# Patient Record
Sex: FEMALE | Race: WHITE
[De-identification: names, ages, dates, MRNs, and addresses within clinical notes are randomized per-mention and may not be internally consistent; named-entity substitution may affect disease eponyms.]

---

## 2018-12-20 ENCOUNTER — HOSPITAL ENCOUNTER (INPATIENT)
Dept: HOSPITAL 80 - FED | Age: 58
LOS: 3 days | Discharge: HOME | DRG: 856 | End: 2018-12-23
Attending: INTERNAL MEDICINE | Admitting: INTERNAL MEDICINE
Payer: COMMERCIAL

## 2018-12-20 DIAGNOSIS — Z85.3: ICD-10-CM

## 2018-12-20 DIAGNOSIS — Z88.0: ICD-10-CM

## 2018-12-20 DIAGNOSIS — R65.20: ICD-10-CM

## 2018-12-20 DIAGNOSIS — E87.1: ICD-10-CM

## 2018-12-20 DIAGNOSIS — T81.44XA: Primary | ICD-10-CM

## 2018-12-20 DIAGNOSIS — F17.210: ICD-10-CM

## 2018-12-20 DIAGNOSIS — E87.2: ICD-10-CM

## 2018-12-20 DIAGNOSIS — T85.79XA: ICD-10-CM

## 2018-12-20 DIAGNOSIS — L03.313: ICD-10-CM

## 2018-12-20 DIAGNOSIS — B96.1: ICD-10-CM

## 2018-12-20 DIAGNOSIS — Z92.3: ICD-10-CM

## 2018-12-20 DIAGNOSIS — Z90.12: ICD-10-CM

## 2018-12-20 DIAGNOSIS — Z98.82: ICD-10-CM

## 2018-12-20 DIAGNOSIS — B95.2: ICD-10-CM

## 2018-12-20 DIAGNOSIS — L02.213: ICD-10-CM

## 2018-12-20 DIAGNOSIS — I96: ICD-10-CM

## 2018-12-20 DIAGNOSIS — D64.9: ICD-10-CM

## 2018-12-20 LAB — PLATELET # BLD: 208 10^3/UL (ref 150–400)

## 2018-12-20 PROCEDURE — C1751 CATH, INF, PER/CENT/MIDLINE: HCPCS

## 2018-12-20 PROCEDURE — 0HPU0JZ REMOVAL OF SYNTHETIC SUBSTITUTE FROM LEFT BREAST, OPEN APPROACH: ICD-10-PCS | Performed by: SURGERY

## 2018-12-20 PROCEDURE — 0HB5XZZ EXCISION OF CHEST SKIN, EXTERNAL APPROACH: ICD-10-PCS | Performed by: SURGERY

## 2018-12-20 PROCEDURE — 0J960ZZ DRAINAGE OF CHEST SUBCUTANEOUS TISSUE AND FASCIA, OPEN APPROACH: ICD-10-PCS | Performed by: SURGERY

## 2018-12-20 NOTE — EDPHY
H & P


Stated Complaint: L nipple swelling redness


Time Seen by Provider: 12/20/18 18:02


HPI/ROS: 





CHIEF COMPLAINT:  Left breast infection





HISTORY OF PRESENT ILLNESS:  58-year-old female presents with a left breast 

infection.  She had a mastectomy in August 2018 and had an implant placed at 

that time.  In September 2018 she had nipple reconstruction.  The nipple became 

necrotic in the postop period and she has followed up her surgeon.  However 

over the last 10 days, she has developed erythema over the left breast, 

associated with increasing moderate pain and purulent drainage.  Today she 

developed a fever and feels ill.





REVIEW OF SYSTEMS:  complete 10 point ROS reviewed and is negative except for 

the noted elements in the HPI





Source: Patient





- Personal History


Current Tetanus/Diphtheria Vaccine: Unsure


Current Tetanus Diphtheria and Acellular Pertussis (TDAP): Unsure





- Medical/Surgical History


Hx Asthma: No


Hx Chronic Respiratory Disease: No


Hx Diabetes: No


Hx Cardiac Disease: No


Hx Renal Disease: No


Hx Cirrhosis: No


Hx Alcoholism: No


Hx HIV/AIDS: No


Hx Splenectomy or Spleen Trauma: No


Other PMH: breast CA 2004, tonsilectomy, breast reconstruction





- Social History


Smoking Status: Current some day smoker


Alcohol Use: Sober


Drug Use: None





- Physical Exam


Exam: 





General Appearance:  Alert, pleasant, nontoxic-appearing


Eyes:  Pupils equal and round, no conjunctival pallor or injection


ENT, Mouth:  Mucous membranes moist


Neck:  Normal inspection


Respiratory:  Lungs are clear to auscultation


Breast:  Diffuse erythema, swelling and tenderness of the left breast, the 

erythema extends to the right anterior chest wall, necrosis of the nipple, with 

purulent drainage between the nipple and the remainder of the breast


Cardiovascular:  Regular tachycardia


Gastrointestinal:  Abdomen is soft and nontender


Neurological:  A&O, nonfocal exam


Skin:  Warm and dry


Extremities:  Normal inspection, no swelling


Psychiatric:  Mood and affect normal





Constitutional: 


 Initial Vital Signs











Temperature (C)  39.2 C H  12/20/18 17:36


 


Heart Rate  123 H  12/20/18 17:36


 


Respiratory Rate  16   12/20/18 17:36


 


Blood Pressure  156/97 H  12/20/18 17:36


 


O2 Sat (%)  92   12/20/18 17:36








 











O2 Delivery Mode               Room Air














Allergies/Adverse Reactions: 


 





amoxicillin Allergy (Verified 12/20/18 17:35)


 


Penicillins Allergy (Verified 12/20/18 17:35)


 








Home Medications: 














 Medication  Instructions  Recorded


 


Aspirin [Aspirin 81mg (*)] 81 mg PO DAILY 12/20/18


 


Cholecalciferol Vit D3 [Vitamin D3 1,000 units PO DAILY 12/20/18





(*)]  


 


Herbals/Supplements -Info Only 1 ea PO DAILY 12/20/18


 


Letrozole [Femara 2.5 mg (*)] 2.5 mg PO DAILY 12/20/18


 


Vancomycin [Vancomycin (*)] 1,250 mg IV DAILY  vial 12/23/18


 


levOFLOXACIN [levAQUIN (*)] 750 mg PO HS #10 tab 12/23/18














Medical Decision Making


ED Course/Re-evaluation: 





This pt presents with left breast mastitis/abscess and infection of the 

implant.  She meets SIRS criteria, initial lactate 2.8.  c/w severe sepsis.  

IVF per sepsis protocol initiated.  Wound cx obtained.  Ancef 2gm IV given 

after blood cultures obtained.  Dr. Del Richter was consulted and saw the 

pt in the ED.  Repeat lactate obtained and is 2.2.  The hospitalist service was 

consulted for admission.  The pt was taken directly to the OR by Dr. Richter. BP adequate throughout ED stay.





This pt utilized 35 minutes of critical care time exclusive of unbundled 

procedures.  Time spent in serial assessments of pt, ordering/review of labs, 

medication ordering, discussions with consultants.





  


Differential Diagnosis: 





includes though not limited to other causes of infection such as pneumonia, 

cellulitis, pyelo, intraabd infection








- Data Points


Laboratory Results: 


 Laboratory Results





 12/20/18 18:23 





 12/20/18 18:23 








Medications Given: 


 








Discontinued Medications





Acetaminophen (Tylenol)  1,000 mg PO EDNOW ONE


   Stop: 12/20/18 17:45


   Last Admin: 12/20/18 17:47 Dose:  1,000 mg


Acetaminophen (Tylenol)  650 mg PO Q6HRS PRN


   PRN Reason: Pain, Mild/Fever, Can Take PO


   Stop: 06/19/19 06:28


   Last Admin: 12/21/18 18:23 Dose:  650 mg


Bacitracin (Bacitracin Syringe) Confirm Administered Dose 100,000 units IRR .STK

-MED ONE


   Stop: 12/20/18 19:40


   Last Admin: 12/20/18 21:12 Dose:  Not Given


Bupivacaine HCl (Sensorcaine 0.5% Vial) Confirm Administered Dose 30 ml .ROUTE 

.STK-MED ONE


   Stop: 12/20/18 20:46


   Last Admin: 12/20/18 21:12 Dose:  30 ml


Cholecalciferol (Vitamin D)  1,000 units PO DAILY ANNAMARIE


   Stop: 06/19/19 08:59


   Last Admin: 12/23/18 08:13 Dose:  1,000 units


Sodium Chloride (Ns)  1,500 mls @ 3,000 mls/hr 30 ml/kg infuse over 30 min (

1500 ml) IV EDNOW ONE


   PRN Reason: Protocol


   Stop: 12/20/18 19:00


   Last Admin: 12/20/18 18:45 Dose:  1,500 mls


Cefazolin Sodium/Dextrose (Ancef)  100 mls @ 200 mls/hr IV EDNOW ONE


   PRN Reason: Protocol


   Stop: 12/20/18 19:14


   Last Admin: 12/20/18 18:50 Dose:  100 mls


Lactated Ringer's (Lr)  1,000 mls @ 0 mls/hr IV ONCE ONE


   PRN Reason: KVO


   Stop: 12/20/18 19:54


   Last Admin: 12/20/18 20:27 Dose:  1,000 mls


Cefazolin Sodium/Dextrose (Ancef)  100 mls @ 200 mls/hr IV Q8HRS ANNAMARIE


   PRN Reason: Protocol


   Stop: 01/20/19 05:59


   Last Admin: 12/21/18 04:57 Dose:  100 mls


Sodium Chloride (Ns)  500 mls @ 0 mls/hr IV ONCE ONE


   PRN Reason: Wide Open


   Stop: 12/21/18 07:01


   Last Admin: 12/21/18 07:02 Dose:  500 mls


Cefepime HCl 2 gm/ Sodium (Chloride)  100 mls @ 200 mls/hr IV Q8H ANNAMARIE


   PRN Reason: Protocol


   Stop: 01/20/19 10:29


   Last Admin: 12/23/18 10:28 Dose:  100 mls


Vancomycin HCl 750 mg/ (Dextrose)  165 mls @ 165 mls/hr IV Q12H ANNAMARIE


   PRN Reason: Protocol


   Stop: 01/20/19 10:29


   Last Admin: 12/23/18 11:20 Dose:  165 mls


Sodium Chloride (Ns)  1,000 mls @ 3,000 mls/hr IV ONCE ONE


   Stop: 12/21/18 10:49


   Last Admin: 12/21/18 10:39 Dose:  1,000 mls


Letrozole (Femara)  2.5 mg PO DAILY [facility]


   Stop: 06/19/19 08:59


   Last Admin: 12/23/18 08:13 Dose:  2.5 mg


Midazolam HCl (Versed)  2 mg IVP ONCALL ONE


   Stop: 12/20/18 20:15


   Last Admin: 12/20/18 20:26 Dose:  2 mg


Oxycodone HCl (Oxycodone Ir)  5 - 10 mg PO Q4HRS PRN


   PRN Reason: PACU, Pain Severe


   Stop: 12/20/18 21:17


   Last Admin: 12/20/18 21:27 Dose:  5 mg


Oxycodone HCl (Oxycodone Ir)  5 - 10 mg PO Q4HRS PRN


   PRN Reason: Pain, Severe Able to Take PO


   Stop: 12/30/18 21:09


   Last Admin: 12/23/18 08:12 Dose:  5 mg


Polymyxin B Sulfate (Polymyxin B Syringe) Confirm Administered Dose 1,000,000 

unit IRR .STK-MED ONE


   Stop: 12/20/18 19:40


   Last Admin: 12/20/18 21:12 Dose:  Not Given


Potassium Chloride (Klor-Con)  40 meq PO ONCE ONE


   Stop: 12/22/18 10:14


   Last Admin: 12/22/18 10:49 Dose:  40 meq


Senna/Docusate Sodium (Senokot-S)  1 - 2 tab PO BID ANNAMARIE


   PRN Reason: Protocol


   Stop: 06/21/19 11:29


   Last Admin: 12/23/18 12:16 Dose:  2 tab


Vancomycin HCl (Vancomycin Hcl) Confirm Administered Dose 1 gm .ROUTE .STK-MED 

ONE


   Stop: 12/20/18 19:40


   Last Admin: 12/20/18 21:13 Dose:  Not Given








Departure





- Departure


Disposition: [facility]


Clinical Impression: 


Infection of breast implant


Qualifiers:


 Encounter type: initial encounter Qualified Code(s): T85.79XA - Infection and 

inflammatory reaction due to other internal prosthetic devices, implants and 

grafts, initial encounter





Condition: Serious

## 2018-12-20 NOTE — POSTANESTH
Post Anesthetic Evaluation


Cardiovascular Status: Similar to Pre-Op Cond (A little tachycardic (105), 

likely due to dehydration and fever.)


Respiratory Status: Normal, Stable, Similar to Pre-op Cond.


Level of Consciousness/Mental Status: Can Participate in Eval, Mildly Sleepy, 

Arousable


Pain Control: Adequate, Prn Tx Ordered


Nausea/Vomiting Control: Adequate, Prn Tx Ordered


Complications Possibly Related to Anesthesia: None Noted

## 2018-12-20 NOTE — PDGENHP
History and Physical


History and Physical: 





Chief complaint: []





History of present illness: []





Past medical history: []





Past surgical history:  []





Medications: []





Allergies: []





Social history: []





Family history: []





Review of systems:  10 point review of systems was conducted and is negative 

except per HPI





Physical exam:


Vitals:  Reviewed





Labs: []





Other Data: []





Impression and plan:


[]

## 2018-12-20 NOTE — PDGENHP
History and Physical





- Chief Complaint


L breast infection





- History of Present Illness


Otherwise health 57yo F from [place] presents with acute onset L breast pain and 

drainage. Briefly, patient is s/p mastectomy c radiation and recon for L breast 

cancer. Most treatment took place in Spring of 2017. Has had nipple healing 

issues since the operation and has had revision x1. Is in town visiting family 

and noticed that the chest wall has been somewhat erythematous for about the 

last week or so. Initially blamed this on heating pads she was using to augment 

blood flow. Today, hasnt felt well all day and has noticed drainage from the 

area which prompted her presentation here. She denies fevers at home, but 

endorses chills. Pain is located over the majority of the L breast is 7/10 in 

intensity and nonradiating and is described as burning. 





History Information





- Allergies/Home Medication List


Allergies/Adverse Reactions: 








amoxicillin Allergy (Verified 12/20/18 17:35)


 


Penicillins Allergy (Verified 12/20/18 17:35)


 





Home Medications: 








Aspirin [Aspirin 81mg (*)] 81 mg PO DAILY 12/20/18 [Last Taken Unknown]


Cholecalciferol Vit D3 [Vitamin D3 (*)] 1,000 units PO DAILY 12/20/18 [Last 

Taken Unknown]


Herbals/Supplements -Info Only 1 ea PO DAILY 12/20/18 [Last Taken Unknown]


Letrozole [Femara 2.5 mg (*)] 2.5 mg PO DAILY 12/20/18 [Last Taken 12/20/18]





I have personally reviewed and updated: family history, medical history, social 

history, surgical history


Past Medical History: breast cancer





- Surgical History


Additional surgical history: L mastectomy, tissue expanders and implant c 

nipple revision





- Family History


Positive for: non-pertinent





- Social History


Smoking Status: Current some day smoker


Alcohol Use: Sober


Drug Use: None


Additional social history: in town from [place] visiting family





Review of Systems


Review of Systems: 





ROS: 10pt was reviewed & negative except for what was stated in HPI & below





Physical Exam


Physical Exam: 

















Temp Pulse Resp BP Pulse Ox


 


 98.8 C H  97   18   134/81 H  97 


 


 12/20/18 18:44  12/20/18 19:15  12/20/18 19:15  12/20/18 19:15  12/20/18 19:15











Constitutional: appears nourished, not in pain, uncomfortable


Eyes: PERRL, anicteric sclera, EOMI


Ears, Nose, Mouth, Throat: moist mucous membranes, hearing normal, ears appear 

normal, no oral mucosal ulcers


Cardiovascular: regular rate and rhythym, no murmur, rub, or gallop, No edema


Respiratory: no respiratory distress, no rales or rhonchi, clear to auscultation


Gastrointestinal: normoactive bowel sounds, soft, non-tender abdomen, no 

palpable masses


Genitourinary: no bladder fullness, no bladder tenderness


Skin: other (L breast: central portion adjacent to nipple is open and tracks 

likely to implant. Nipple is necrotic. Breast is indurated, erythematous and 

very tender. ), No mottled


Musculoskeletal: full muscle strength, no muscle tenderness, normal joint ROM, 

no joint effusions


Neurologic: AAOx3, sensation intact bilaterally, No weakness, No numbness


Psychiatric: interacting appropriately, not anxious, not encephalopathic, 

thought process linear


Lymph, Heme, Immunologic: no cervical LAD, no supraclavicular LAD





Lab Data & Imaging Review





 12/20/18 18:23





 12/20/18 18:23














WBC  6.80 10^3/uL (3.80-9.50)   12/20/18  18:23    


 


RBC  3.39 10^6/uL (4.18-5.33)  L  12/20/18  18:23    


 


Hgb  11.9 g/dL (12.6-16.3)  L  12/20/18  18:23    


 


Hct  33.3 % (38.0-47.0)  L  12/20/18  18:23    


 


MCV  98.2 fL (81.5-99.8)   12/20/18  18:23    


 


MCH  35.1 pg (27.9-34.1)  H  12/20/18  18:23    


 


MCHC  35.7 g/dL (32.4-36.7)   12/20/18  18:23    


 


RDW  13.0 % (11.5-15.2)   12/20/18  18:23    


 


Plt Count  208 10^3/uL (150-400)   12/20/18  18:23    


 


MPV  9.3 fL (8.7-11.7)   12/20/18  18:23    


 


Neut % (Auto)  75.7 % (39.3-74.2)  H  12/20/18  18:23    


 


Lymph % (Auto)  12.2 % (15.0-45.0)  L  12/20/18  18:23    


 


Mono % (Auto)  11.5 % (4.5-13.0)   12/20/18  18:23    


 


Eos % (Auto)  0.0 % (0.6-7.6)  L  12/20/18  18:23    


 


Baso % (Auto)  0.3 % (0.3-1.7)   12/20/18  18:23    


 


Nucleat RBC Rel Count  0.0 % (0.0-0.2)   12/20/18  18:23    


 


Absolute Neuts (auto)  5.15 10^3/uL (1.70-6.50)   12/20/18  18:23    


 


Absolute Lymphs (auto)  0.83 10^3/uL (1.00-3.00)  L  12/20/18  18:23    


 


Absolute Monos (auto)  0.78 10^3/uL (0.30-0.80)   12/20/18  18:23    


 


Absolute Eos (auto)  0.00 10^3/uL (0.03-0.40)  L  12/20/18  18:23    


 


Absolute Basos (auto)  0.02 10^3/uL (0.02-0.10)   12/20/18  18:23    


 


Absolute Nucleated RBC  0.00 10^3/uL (0-0.01)   12/20/18  18:23    


 


Immature Gran %  0.3 % (0.0-1.1)   12/20/18  18:23    


 


Immature Gran #  0.02 10^3/uL (0.00-0.10)   12/20/18  18:23    


 


VBG Lactic Acid  2.8 mmol/L (0.7-2.1)  H  12/20/18  18:20    


 


Sodium  133 mEq/L (135-145)  L  12/20/18  18:23    


 


Potassium  3.7 mEq/L (3.5-5.2)   12/20/18  18:23    


 


Chloride  100 mEq/L ()   12/20/18  18:23    


 


Carbon Dioxide  21 mEq/l (22-31)  L  12/20/18  18:23    


 


Anion Gap  12 mEq/L (6-14)   12/20/18  18:23    


 


BUN  7 mg/dL (7-23)   12/20/18  18:23    


 


Creatinine  0.6 mg/dL (0.6-1.0)   12/20/18  18:23    


 


Estimated GFR  > 60   12/20/18  18:23    


 


Glucose  104 mg/dL ()  H  12/20/18  18:23    


 


Calcium  8.9 mg/dL (8.5-10.4)   12/20/18  18:23    








Chest X-Ray results: no infiltrate





Assessment & Plan


Assessment: 





57yo F c L breast infection c implant


Plan: 





discussed with the patient that the treatment for this is source control. This 

includes removal of the implant.


- plan for OR for washout and explant of silicone implant


- BS abx


- will ultimately need revision, will likely have this done in [place] once source 

is washed.

## 2018-12-20 NOTE — PDANEPAE
ANE Past Medical History





- Pulmonary History


Hx Oxygen in Use at Home: No





- Endocrine History


Hx Diabetes: No


Obesity: no


Endocrine History Comment: Graves disease 30 years ago--"it burned itself out".





ANE Review of Systems


Review of Systems: 








ANE Patient History





- Allergies


Allergies/Adverse Reactions: 








amoxicillin Allergy (Verified 12/20/18 17:35)


 


Penicillins Allergy (Verified 12/20/18 17:35)


 








- Home Medications


Home Medications: 








Aspirin [Aspirin 81mg (*)] 81 mg PO DAILY 12/20/18 [Last Taken Unknown]


Cholecalciferol Vit D3 [Vitamin D3 (*)] 1,000 units PO DAILY 12/20/18 [Last 

Taken Unknown]


Herbals/Supplements -Info Only 1 ea PO DAILY 12/20/18 [Last Taken Unknown]


Letrozole [Femara 2.5 mg (*)] 2.5 mg PO DAILY 12/20/18 [Last Taken 12/20/18]








- NPO status


NPO Since - Liquids (Date): 12/20/18


NPO Since - Liquids (Time): 18:35


NPO Since - Solids (Date): 12/20/18


NPO Since - Solids (Time): 11:30





- Anes Hx


Anes Hx: no prior problems





- Smoking Hx


Smoking Status: Current some day smoker





- Alcohol Use


Alcohol Use: Sober





ANE Labs/Vital Signs





- Labs


Result Diagrams: 


 12/20/18 18:23





 12/20/18 18:23





- Vital Signs


Blood Pressure: 119/84


Heart Rate: 91


Respiratory Rate: 18


O2 Sat (%): 95


Height: 162.56 cm


Weight: 50.349 kg





ANE Physical Exam





- Airway


Neck exam: FROM


Mallampati Score: Class 2


Mouth exam: normal dental/mouth exam





- Pulmonary


Pulmonary: no respiratory distress, no rales or rhonchi, clear to auscultation





- Cardiovascular


Cardiovascular: regular rate and rhythym, no murmur, rub, or gallop





- ASA Status


ASA Status: II





ANE Anesthesia Plan


Anesthesia Plan: GA w LMA

## 2018-12-21 LAB — PLATELET # BLD: 186 10^3/UL (ref 150–400)

## 2018-12-21 PROCEDURE — 02H633Z INSERTION OF INFUSION DEVICE INTO RIGHT ATRIUM, PERCUTANEOUS APPROACH: ICD-10-PCS | Performed by: RADIOLOGY

## 2018-12-21 RX ADMIN — ACETAMINOPHEN PRN MG: 325 TABLET ORAL at 06:51

## 2018-12-21 RX ADMIN — VITAMIN D, TAB 1000IU (100/BT) SCH UNITS: 25 TAB at 09:07

## 2018-12-21 RX ADMIN — VANCOMYCIN HYDROCHLORIDE SCH MLS: 500 INJECTION, POWDER, LYOPHILIZED, FOR SOLUTION INTRAVENOUS at 11:32

## 2018-12-21 RX ADMIN — DEXTROSE MONOHYDRATE SCH MLS: 5 INJECTION, SOLUTION INTRAVENOUS at 12:58

## 2018-12-21 RX ADMIN — DEXTROSE MONOHYDRATE SCH MLS: 5 INJECTION, SOLUTION INTRAVENOUS at 17:40

## 2018-12-21 RX ADMIN — LETROZOLE SCH MG: 2.5 TABLET, FILM COATED ORAL at 09:07

## 2018-12-21 RX ADMIN — VANCOMYCIN HYDROCHLORIDE SCH MLS: 500 INJECTION, POWDER, LYOPHILIZED, FOR SOLUTION INTRAVENOUS at 23:06

## 2018-12-21 RX ADMIN — OXYCODONE HYDROCHLORIDE PRN MG: 15 TABLET ORAL at 00:03

## 2018-12-21 RX ADMIN — ACETAMINOPHEN PRN MG: 325 TABLET ORAL at 18:23

## 2018-12-21 NOTE — GCON
INFECTIOUS DISEASE CONSULTATION



DATE OF CONSULTATION:  12/21/2018



REFERRING PHYSICIAN:  Sander Chacon MD





REASON FOR CONSULTATION:  Sepsis due to left-sided breast infection.



HISTORY OF PRESENT ILLNESS:  The patient is a 58-year-old female with a past medical history of breas
t cancer, status post mastectomy with reconstruction and nipple revision, who I am asked to see in co
nsultation for sepsis associated with left breast infection.  The patient describes having left maste
ctomy and reconstruction in August of 2018 for breast cancer.  She notes that she also had radiation 
therapy.  The patient subsequently had nipple revision and developed difficulty with nipple healing a
nd presence of necrosis.  Over the last 10 days, patient has noted increasing induration, tenderness 
and erythema over the left breast.  This subsequently became associated with malodorous purulent drai
nage.  The patient also had malaise and decreased appetite.  She did not note fever or chills.  



Upon presentation to [facility], she had a temperature maximum of 39.2 with associated 
mild increase in lactate and tachycardia.  She was taken to the operating room yesterday where she wa
s noted to have a necrotic nipple with implant exposure.  The patient had the necrotic nipple removed
, and her implant also was removed.  Purulent fluid was noted to be present in the entire implant cav
ity.  No other abscesses were noted.  The patient was being treated with cefazolin postoperatively.  
Gram stain of the patient's operative specimen is polymicrobial with presence of GPCs in clusters, GP
Cs in chains, and gram-negative rods.  Based on these findings, her antibiotics have been modified ea
rlier today to include vancomycin and cefepime.  The patient had some transient hypotension earlier t
murali which appears to be responding to IV fluid.  Lactic acid decreased yesterday to 1.2.  The patien
t does not note any other areas of skin and soft tissue infection.  No history of MRSA infection.  No
 recent antibiotic use.  No history of nausea, vomiting or diarrhea.  Given the above findings, I am 
now asked to assist in her ongoing management.



PAST MEDICAL HISTORY:  Breast cancer, Graves disease.



PAST SURGICAL HISTORY:  Left-sided mastectomy with reconstruction including implant placement and nip
ple revision, tonsillectomy.



CURRENT MEDICATIONS:  Vancomycin 750 mg IV q.12 hours, cefepime 2 g IV q.8 hours, vitamin D 1000 unit
s p.o. daily, letrozole 2.5 mg p.o. daily, Oxy IR as needed.



ALLERGIES:  Penicillin associated with rash (no hives).



SOCIAL HISTORY:  Patient smokes several cigarettes daily or every few days.  The patient notes she dr
inks wine with dinner and has cocktails daily.  No drug use.  No pets.  Typically lives near [place].




FAMILY HISTORY:  Graves disease, breast cancer.



REVIEW OF SYSTEMS:  Outside that noted in HPI, the remainder of a 10-system review is unremarkable.



PHYSICAL EXAMINATION:  VITAL SIGNS:  Temperature maximum 39.2, temperature current 36.7, heart rate 8
2, respiratory rate 18, blood pressure 101/70, oxygen saturation 99% on 1.5 L.  GENERAL:  Patient is 
a thin female in no acute distress.  She appears nontoxic.  HEENT:  There is no scleral icterus, conj
unctival injection, or conjunctival petechiae.  The oropharynx is clear without lesions.  Dentition i
n fair repair.  There is no nasal discharge.  There is no tenderness over the frontal maxillary or ma
stoid area.  Mucous membranes are moist.  NECK:  Supple without palpable lymphadenopathy or thyromega
ly.  CHEST:  Clear to auscultation bilaterally without adventitious sounds.  Respiratory effort is no
rmal.  CARDIOVASCULAR:  Regular rate and rhythm without murmurs, gallops, or rubs.  ABDOMEN:  Soft, n
ontender, nondistended.  Bowel sounds are present.  BREASTS:  The left breast is dressed postoperativ
ely.  No erythema extending past dressing margins.  MUSCULOSKELETAL:  No cyanosis, clubbing, or edema
.  SKIN:  No rashes present.  No stigmata of endocarditis.  Skin is warm and dry to touch.  NEUROLOGI
C:  Patient is alert and interacts appropriately with examiner.  Cranial nerves 2-12 are grossly inta
ct.  Sensation is grossly intact.  Muscle tone and bulk are normal. LYMPHATICS:  No cervical or supra
clavicular nodes.



LABORATORY DATA:  White blood cell count 6.7, hematocrit 31.9, platelets 186; no differential perform
ed today.  Serum creatinine 0.6, bicarbonate 23, lactic acid 1.2.  Blood cultures x2 sets pending.  G
michelle stain from the operative specimen shows 4+ white blood cells, 4+ GPCs in chains, 3+ gram-negative
 rods, 2+ GPCs in clusters.  Chest x-ray shows no evidence of pneumonia.



IMPRESSION:  Sepsis due to left breast infection status post incision and drainage with implant remov
al and nipple excision:  Gram stain is polymicrobial, which is compatible with presence of necrotic n
ipple.  Gram-positive cocci in clusters are consistent with Staphylococcus aureus.  The patient curre
ntly receiving vancomycin and cefepime, which is reasonable coverage based on Gram stain findings.  W
ill hold off on addition of anaerobic coverage currently unless anaerobic jamal isolated.  We will co
ntinue to assess breast findings over time.  Will follow blood cultures as available.



RECOMMENDATIONS:  

1.  Agree with empiric vancomycin and cefepime.

2.  Side effects of vancomycin and cefepime reviewed with patient. 

3.  Follow up cultures with modification of antibiotics accordingly.

4.  Follow up blood cultures as available. 

5.  Follow clinical response to above measures over time.  



Thank you for this consultation.  We will continue to follow the patient with you.





Job #:  [id number]

## 2018-12-21 NOTE — GOP
DATE OF OPERATION:  12/20/2018



SURGEON:  Del Richter MD



ASSISTANT:  None.



ANESTHESIA:  General endotracheal.



ANESTHESIOLOGIST:  Dr. Jimmie Hillman



PREOPERATIVE DIAGNOSIS:  Left breast abscess.



POSTOPERATIVE DIAGNOSIS:  Left breast abscess.



PROCEDURE PERFORMED:  Incision and drainage of left breast abscess with removal of breast implant.



FINDINGS:  Implant was clearly exposed where the necrotic nipple had been.  Purulent fluid surrounded
 it in the entire cavity.  Implant successfully removed.  Cavity washed out.



SPECIMENS:  Cultures and implant to pathology.



ESTIMATED BLOOD LOSS:  5 cc.



DESCRIPTION OF PROCEDURE:  The patient was greeted in the preoperative suite.  Once again, risks, rafiq
efits, and alternatives were discussed.  Consent was signed.  She was then brought back to the operat
akash suite, placed on the OR table in supine position.  After all anesthesia machines, including SCDs 
were on and functioning, a [employer] time-out was performed.  After successful induct
ion of general anesthesia, the left chest was prepped and draped in the typical sterile fashion.  



I commenced the procedure basically by digitizing the area where the necrotic nipple had pulled away 
from the skin.  The underlying implant was directly visible at that site and digitalizing the area pr
oduced foul smelling purulent fluid.  The remainder of the necrotic nipple was removed sharply throug
h her previous inferolateral incision where the implant was originally placed.  I opened this up matheus
ply to the extent of the incision.  Just deep to this, the implant was encountered.  It was grasped w
ith an Allis clamp and removed with gentle pressure.  It was passed off.  Once the implant was remove
d, the cavity was interrogated.  No other fluid pockets or abscesses were encountered.  It was then i
rrigated with 3 liters sterile saline.  



After irrigation, I infiltrated the area with Marcaine plain.  I saw no other significant pathology. 
 The capsule through my incision was then closed first with interrupted Vicryl.  The skin was then cl
osed with staples.  The central portion, which was open previously was left open and the area was suc
cessfully packed with gauze.  Pressure dressing was placed.  The patient was then extubated in the op
erative suite and taken to the PACU in satisfactory condition.



DRAINS:  None.



COUNTS:  All counts were reported as correct x2.





Job #:  [id number]

## 2018-12-21 NOTE — PDMN
Medical Necessity


Medical necessity: Change to inpt as of 12/21/18 @ 10:46 as pt meets inpt 

criteria per MD order and MCG M-160, Sepsis and Other Febrile Illness, without 

Focal Infection and M-70, Cellulitis. 57 y/o w/hx breast cancer/mastectomy and 

reconstruction August 2018 w/complicated recovery, presented w/increasing pain 

and redness to L breast, admitted w/L breast cellulitis and sepsis, required I&

D w/implant removal and washout upon admission. Upgraded to inpt for severe 

sepsis d/t L breast infection as evidenced by lactic acidosis, fever- 101 this 

AM, and hypotension w/BP this AM 85/53. ID consult, surg following, IVF, IV ABX'

s, pain management. Est LOS>2MN for ongoing eval/management of above.

## 2018-12-21 NOTE — PDGENHP
History and Physical





- Chief Complaint


L breast redness, swelling





- History of Present Illness


59 yo F w/ hx of breast CA presents with L breast redness and swelling. She 

underwent L mastectomy and reconstruction in August of 2018 for breast CA. Her 

recovery from this has been complicated. The nipple became necrotic and never 

fully healed. Over the last few days she has noticed redness over the L breast 

associated with increasing pain and purulent drainage. On the day of admission 

she developed fevers so she came in for evaluation.





In the ED she was noted to be febrile and tachycardic with an elevated lactate. 

She was taken to the OR by surgery for an I&D with findings consistent with 

infection per the brief post-operative note. I evaluated patient in the post-op 

setting and she is doing quite well. Her pain is well controlled with oxycodone 

and she denies other symptoms at this time.





Case discussed with Dr. Vaughn; records reviewed and summarized above.





History Information





- Allergies/Home Medication List


Allergies/Adverse Reactions: 








amoxicillin Allergy (Verified 12/20/18 17:35)


 


Penicillins Allergy (Verified 12/20/18 17:35)


 





Home Medications: 








Aspirin [Aspirin 81mg (*)] 81 mg PO DAILY 12/20/18 [Last Taken Unknown]


Cholecalciferol Vit D3 [Vitamin D3 (*)] 1,000 units PO DAILY 12/20/18 [Last 

Taken Unknown]


Herbals/Supplements -Info Only 1 ea PO DAILY 12/20/18 [Last Taken Unknown]


Letrozole [Femara 2.5 mg (*)] 2.5 mg PO DAILY 12/20/18 [Last Taken 12/20/18]





I have personally reviewed and updated: family history, medical history


Past Medical History: breast cancer





- Past Medical History


cancer





- Surgical History


Additional surgical history: L mastectomy, tissue expanders and implant c 

nipple revision





- Family History


Positive for: non-pertinent





- Social History


Smoking Status: Current some day smoker


Alcohol Use: Sober


Drug Use: None


Additional social history: in town from [place] visiting family





Review of Systems


Review of Systems: 





ROS: 10pt was reviewed & negative except for what was stated in HPI & below





Physical Exam


Physical Exam: 

















Temp Pulse Resp BP Pulse Ox


 


 37.0 C   98   16   132/79 H  95 


 


 12/21/18 00:00  12/21/18 00:00  12/21/18 00:00  12/21/18 00:00  12/21/18 00:00




















O2 (L/minute)                  1














Constitutional: no apparent distress, uncomfortable


Eyes: PERRL, EOMI


Ears, Nose, Mouth, Throat: moist mucous membranes, no oral mucosal ulcers


Cardiovascular: regular rate and rhythym, systolic murmur


Respiratory: no respiratory distress, clear to auscultation


Gastrointestinal: normoactive bowel sounds, soft, non-tender abdomen


Skin: warm, other (L breast bandage c/d/i)


Musculoskeletal: full muscle strength, no muscle tenderness


Neurologic: AAOx3, CN II-XII Intact


Psychiatric: interacting appropriately, not anxious





Lab Data & Imaging Review





 12/20/18 18:23





 12/20/18 18:23














WBC  6.80 10^3/uL (3.80-9.50)   12/20/18  18:23    


 


RBC  3.39 10^6/uL (4.18-5.33)  L  12/20/18  18:23    


 


Hgb  11.9 g/dL (12.6-16.3)  L  12/20/18 18:23    


 


Hct  33.3 % (38.0-47.0)  L  12/20/18  18:23    


 


MCV  98.2 fL (81.5-99.8)   12/20/18  18:23    


 


MCH  35.1 pg (27.9-34.1)  H  12/20/18 18:23    


 


MCHC  35.7 g/dL (32.4-36.7)   12/20/18  18:23    


 


RDW  13.0 % (11.5-15.2)   12/20/18 18:23    


 


Plt Count  208 10^3/uL (150-400)   12/20/18  18:23    


 


MPV  9.3 fL (8.7-11.7)   12/20/18  18:23    


 


Neut % (Auto)  75.7 % (39.3-74.2)  H  12/20/18 18:23    


 


Lymph % (Auto)  12.2 % (15.0-45.0)  L  12/20/18  18:23    


 


Mono % (Auto)  11.5 % (4.5-13.0)   12/20/18  18:23    


 


Eos % (Auto)  0.0 % (0.6-7.6)  L  12/20/18  18:23    


 


Baso % (Auto)  0.3 % (0.3-1.7)   12/20/18  18:23    


 


Nucleat RBC Rel Count  0.0 % (0.0-0.2)   12/20/18  18:23    


 


Absolute Neuts (auto)  5.15 10^3/uL (1.70-6.50)   12/20/18  18:23    


 


Absolute Lymphs (auto)  0.83 10^3/uL (1.00-3.00)  L  12/20/18  18:23    


 


Absolute Monos (auto)  0.78 10^3/uL (0.30-0.80)   12/20/18  18:23    


 


Absolute Eos (auto)  0.00 10^3/uL (0.03-0.40)  L  12/20/18  18:23    


 


Absolute Basos (auto)  0.02 10^3/uL (0.02-0.10)   12/20/18  18:23    


 


Absolute Nucleated RBC  0.00 10^3/uL (0-0.01)   12/20/18  18:23    


 


Immature Gran %  0.3 % (0.0-1.1)   12/20/18  18:23    


 


Immature Gran #  0.02 10^3/uL (0.00-0.10)   12/20/18  18:23    


 


VBG Lactic Acid  1.2 mmol/L (0.7-2.1)   12/20/18  21:48    


 


Sodium  133 mEq/L (135-145)  L  12/20/18  18:23    


 


Potassium  3.7 mEq/L (3.5-5.2)   12/20/18  18:23    


 


Chloride  100 mEq/L ()   12/20/18  18:23    


 


Carbon Dioxide  21 mEq/l (22-31)  L  12/20/18  18:23    


 


Anion Gap  12 mEq/L (6-14)   12/20/18  18:23    


 


BUN  7 mg/dL (7-23)   12/20/18  18:23    


 


Creatinine  0.6 mg/dL (0.6-1.0)   12/20/18  18:23    


 


Estimated GFR  > 60   12/20/18  18:23    


 


Glucose  104 mg/dL ()  H  12/20/18  18:23    


 


Calcium  8.9 mg/dL (8.5-10.4)   12/20/18  18:23    








Imaging Review: 





 Imaging Impressions





Chest X-Ray  12/20/18 18:02


Impression: Question mild bronchitis. No other findings for acute 

cardiopulmonary abnormality.














Assessment & Plan


Assessment: 





59 yo F w/ hx of L breast CA and mastectomy/reconstruction presents with L 

breast infection.


Plan: 


1. Sepsis 2/2 L breast cellulitis - Sepsis per fever and tachycardia; lactate 

elevated on presentation. Complicated by presence of silicone implant; now s/p 

washout in OR and removal of implant per general surgery.


- Cefazolin 2g IV q8h


- Blood and tissue cultures pending


- Oxycodone PRN for pain


- Will need reconstruction but will likely return home to [place] for this


2. Breast CA - With mastectomy and reconstruction in August of 2018.


- Continue Letrozole daily


3. Hyponatremia - Mild, suspect secondary to infection and sepsis.


- S/p fluid bolus in ED


- Repeat BMP in the AM





Diet - Regular


Code - Full


Ppx - SCDs


Dispo - Admit under observation status

## 2018-12-21 NOTE — SOAPPROG
SOAP Progress Note


Assessment/Plan: 


Assessment:





59yo F s/p L breast I&D c implant removal


- febrile overnight, BP improving


- skin looks better, dressing semi-saturated. Remove tomorrow. 


- OK to shower, will need to cover area where nipple was as there is no tissue 

here


- IV abx per ID/IM


- no dietary, or lifting restrictions. will need f/u with plastics in [place] for 

recon once infection has resolved. 




















Plan:





12/21/18 12:27





Subjective: 





feels better


Objective: 





 Vital Signs











Temp Pulse Resp BP Pulse Ox


 


 36.3 C   86   18   116/74   99 


 


 12/21/18 11:34  12/21/18 11:34  12/21/18 11:34  12/21/18 11:34  12/21/18 11:34








 Microbiology











 12/20/18 20:48 Gram Stain - Final





 Breast - Eswab 


 


 12/20/18 20:48 Mycobacterial Smear (YOUNG) - Final





 Breast - Eswab Mycobacterial Culture - Final


 


 12/20/18 18:56 Gram Stain - Final





 Breast - Swab 








 Laboratory Results





 12/21/18 05:15 





 12/21/18 05:15 





 











 12/20/18 12/21/18 12/22/18





 05:59 05:59 05:59


 


Intake Total  1540 


 


Output Total  520 400


 


Balance  1020 -400














ICD10 Worksheet


Patient Problems: 


 Problems











Problem Status Onset


 


Cellulitis Acute

## 2018-12-21 NOTE — HOSPPROG
Hospitalist Progress Note


Assessment/Plan: 





# severe sepsis d/t soft tissue infection (lactic acidosis, hypotension)


   - bolus another 1L NS


   - place PICC


   - transfer to ICU if BP does not improve


# breast cellulitis - gram stain with GPC clusters and chains, GNR's


   - change abx to vanc/cefepime


   - ID consult as this is polymicrobial


# pcn allergy - rash, no anaphylaxis


# breast cancer s/p mastectomy, XRT, reconstruction (8/2018)


# hyponatremia - follow tomorrow


# anemia - suspect chronic





Subjective: very dizzy with ambulation


Objective: 


 Vital Signs











Temp Pulse Resp BP Pulse Ox


 


 36.9 C   82   16   93/60 L  97 


 


 12/21/18 08:41  12/21/18 09:05  12/21/18 09:05  12/21/18 09:05  12/21/18 08:41








 Microbiology











 12/20/18 20:48 Gram Stain - Final





 Breast - Eswab 


 


 12/20/18 20:48 Mycobacterial Smear (YOUNG) - Final





 Breast - Eswab Mycobacterial Culture - Final


 


 12/20/18 18:56 Gram Stain - Final





 Breast - Swab 








 Laboratory Results





 12/21/18 05:15 





 12/21/18 05:15 





 











 12/20/18 12/21/18 12/22/18





 05:59 05:59 05:59


 


Intake Total  1540 


 


Output Total  520 


 


Balance  1020 








35 mins cc time managing severe sepsis





- Physical Exam


Constitutional: no apparent distress, appears nourished


Cardiovascular: regular rate and rhythym, no murmur, rub, or gallop


Respiratory: no respiratory distress, no rales or rhonchi, clear to auscultation


Gastrointestinal: normoactive bowel sounds, soft, non-tender abdomen, no 

palpable masses





ICD10 Worksheet


Patient Problems: 


 Problems











Problem Status Onset


 


Cellulitis Acute

## 2018-12-22 LAB — PLATELET # BLD: 187 10^3/UL (ref 150–400)

## 2018-12-22 RX ADMIN — VANCOMYCIN HYDROCHLORIDE SCH MLS: 500 INJECTION, POWDER, LYOPHILIZED, FOR SOLUTION INTRAVENOUS at 11:56

## 2018-12-22 RX ADMIN — VANCOMYCIN HYDROCHLORIDE SCH MLS: 500 INJECTION, POWDER, LYOPHILIZED, FOR SOLUTION INTRAVENOUS at 22:31

## 2018-12-22 RX ADMIN — DEXTROSE MONOHYDRATE SCH MLS: 5 INJECTION, SOLUTION INTRAVENOUS at 18:06

## 2018-12-22 RX ADMIN — DEXTROSE MONOHYDRATE SCH MLS: 5 INJECTION, SOLUTION INTRAVENOUS at 02:04

## 2018-12-22 RX ADMIN — VITAMIN D, TAB 1000IU (100/BT) SCH UNITS: 25 TAB at 08:24

## 2018-12-22 RX ADMIN — DEXTROSE MONOHYDRATE SCH MLS: 5 INJECTION, SOLUTION INTRAVENOUS at 10:50

## 2018-12-22 RX ADMIN — LETROZOLE SCH MG: 2.5 TABLET, FILM COATED ORAL at 09:27

## 2018-12-22 NOTE — SOAPPROG
SOAP Progress Note


Assessment/Plan: 


Assessment:





FOLLOW-UP NECROTIC NIPPLE EXCISION WITH REMOVAL OF IMPLANT


WOUND IS CLEAN BUT MILDLY ERYTHEMATOUS WHICH MAY BE CHRONIC FROM HER RADIATION 

THERAPY





DRESSING CHANGE TODAY WITH THE WOUND 0 PACKED WITH IODOFORM GAUZE




















Plan:  HOME WHEN OKAYED BY INFECTIOUS DISEASE





12/22/18 14:53





Objective: 





 Vital Signs











Temp Pulse Resp BP Pulse Ox


 


 36.8 C   98   14   124/77 H  92 


 


 12/22/18 11:38  12/22/18 11:38  12/22/18 11:38  12/22/18 11:38  12/22/18 11:38








 Microbiology











 12/20/18 20:48 Gram Stain - Final





 Breast - Eswab 


 


 12/20/18 18:56 Gram Stain - Final





 Breast - Swab 


 


 12/20/18 20:48 Mycobacterial Smear (YOUNG) - Final





 Breast - Eswab Mycobacterial Culture - Final








 Laboratory Results





 12/22/18 05:25 





 12/22/18 05:25 





 











 12/21/18 12/22/18 12/23/18





 05:59 05:59 05:59


 


Intake Total 1540 900 


 


Output Total 520 1003 


 


Balance 1020 -103 














ICD10 Worksheet


Patient Problems: 


 Problems











Problem Status Onset


 


Cellulitis Acute

## 2018-12-22 NOTE — ASMTCMCOM
CM Note

 

CM Note                       

Notes:

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 Pt is visiting 

[place] and presented to ED with breast implant infection after breast cancer mastectomy and 

reconstruction in 2017. No therapies ordered. Anticipate pt will discharge independently, though pt 


may need IV abx upon discharge. PICC line placed yesterday. CM to follow. 



D/C plan: TBD



 

 

Date Signed:  12/22/2018 02:10 PM

Electronically Signed By:Nidia Lucas

## 2018-12-22 NOTE — PCMIDPN
Assessment/Plan: 


Assessment/Plan:


* Left breast infection post reconstruction status post incision and drainage/

resection of necrotic nipple/removal of implant:  Culture showing growth of 

Enterococcus faecalis and lactose fermenting gram-negative john.  Await 

susceptibility on enterococcal isolate in formal identification of gram-

negative john.  Will continue vancomycin and cefepime in interim.  Continue to 

follow clinical course postoperatively and with antibiotic therapy.  Directed 

treatment of Enterococcus may be complicated in the setting of penicillin 

allergy.  Decision regarding oral antibiotic therapy versus IV antibiotic 

therapy will be dependent on further clinical improvement and susceptibility 

profiles of isolated organisms.





12/22/18 16:50





Subjective: 





Patient complains of pain at lower margin of left breast.


Objective: 


 Vital Signs











Temp Pulse Resp BP Pulse Ox


 


 36.9 C   98   16   135/89 H  94 


 


 12/22/18 15:04  12/22/18 15:04  12/22/18 15:04  12/22/18 15:04  12/22/18 15:04








 Microbiology











 12/20/18 20:48 Mycobacterial Smear (YOUNG) - Final





 Breast - Eswab Mycobacterial Culture - Final


 


 12/20/18 20:48 Gram Stain - Final





 Breast - Eswab 


 


 12/20/18 18:56 Gram Stain - Final





 Breast - Swab 








 Laboratory Results





 12/22/18 05:25 





 12/22/18 05:25 





 











 12/21/18 12/22/18 12/23/18





 05:59 05:59 05:59


 


Intake Total 1540 900 


 


Output Total 520 1003 


 


Balance 1020 -103 








Breast cultures with growth of enterococcus faecalis and lactose fermenting gram

-negative john


Blood cultures x2 no growth





- Physical Exam


General Appearance: alert, no apparent distress


EENT: No scleral icterus


Cardiac/Chest: other (Erythema present over left breast region with mild warmth 

and tenderness; central packing in place where nipple previously present; 

staples intact inferiorly; no drainage or areas of tissue necrosis present)


Abdomen: non-tender, No distended





ICD10 Worksheet


Patient Problems: 


 Problems











Problem Status Onset


 


Cellulitis Acute

## 2018-12-22 NOTE — HOSPPROG
Hospitalist Progress Note


Assessment/Plan: 





# severe sepsis d/t soft tissue infection (lactic acidosis, hypotension) - 

resolved today


# breast cellulitis s/p debridement


   - cont vanc/cefepime;  plan for longer term abx still pending


# pcn allergy - rash, no anaphylaxis


# breast cancer s/p mastectomy, XRT, reconstruction (8/2018)


# hyponatremia - follow tomorrow


# anemia - suspect chronic





Subjective: not dizzy today;  tolerated PICC ok


Objective: 


 Vital Signs











Temp Pulse Resp BP Pulse Ox


 


 37.3 C   91   16   122/72 H  91 L


 


 12/22/18 07:31  12/22/18 07:31  12/22/18 07:31  12/22/18 07:31  12/22/18 07:31








 Microbiology











 12/20/18 18:56 Gram Stain - Final





 Breast - Swab 


 


 12/20/18 20:48 Mycobacterial Smear (YOUNG) - Final





 Breast - Eswab Mycobacterial Culture - Final


 


 12/20/18 20:48 Gram Stain - Final





 Breast - Eswab 








 Laboratory Results





 12/22/18 05:25 





 12/22/18 05:25 





 











 12/21/18 12/22/18 12/23/18





 05:59 05:59 05:59


 


Intake Total 1540 900 


 


Output Total 520 1003 


 


Balance 1020 -103 














- Physical Exam


Constitutional: no apparent distress, appears nourished


Cardiovascular: regular rate and rhythym, no murmur, rub, or gallop


Respiratory: no respiratory distress, no rales or rhonchi, clear to auscultation


Gastrointestinal: normoactive bowel sounds, soft, non-tender abdomen, no 

palpable masses





ICD10 Worksheet


Patient Problems: 


 Problems











Problem Status Onset


 


Cellulitis Acute

## 2018-12-23 VITALS — DIASTOLIC BLOOD PRESSURE: 88 MMHG | SYSTOLIC BLOOD PRESSURE: 136 MMHG

## 2018-12-23 RX ADMIN — VITAMIN D, TAB 1000IU (100/BT) SCH UNITS: 25 TAB at 08:13

## 2018-12-23 RX ADMIN — DEXTROSE MONOHYDRATE SCH MLS: 5 INJECTION, SOLUTION INTRAVENOUS at 10:28

## 2018-12-23 RX ADMIN — LETROZOLE SCH MG: 2.5 TABLET, FILM COATED ORAL at 08:13

## 2018-12-23 RX ADMIN — DEXTROSE MONOHYDRATE SCH MLS: 5 INJECTION, SOLUTION INTRAVENOUS at 02:52

## 2018-12-23 RX ADMIN — VANCOMYCIN HYDROCHLORIDE SCH MLS: 500 INJECTION, POWDER, LYOPHILIZED, FOR SOLUTION INTRAVENOUS at 11:20

## 2018-12-23 RX ADMIN — OXYCODONE HYDROCHLORIDE PRN MG: 15 TABLET ORAL at 08:12

## 2018-12-23 NOTE — GDS
ALL DIAGNOSES:  

1.  Left breast cellulitis, status post incision and drainage, as well as implant removal. 

2.  History of breast cancer, status post external radiation therapy, as well as mastectomy and recon
struction.

3.  History of penicillin allergy with a rash.

4.  Hyponatremia.

5.  Anemia.

6.  Severe sepsis with lactic acidosis, as well as hypotension.



HOSPITAL COURSE:  This is a 58-year-old female who presented with a left breast infection.  She is st
atus post mastectomy, XRT, as well as breast reconstruction last summer.  She had the area I and D'd,
 with removal of her implant.  Cultures have grown out Enterococcus faecalis, which is resistant to t
etracyclines, as well as Klebsiella oxytoca which is susceptible to everything, except for ampicillin
. 



She has been treated with vancomycin, as well as cefepime here.  She has been followed by infectious 
Disease, as well as General Surgery.  She will be treated for an additional 4 days of outpatient IV v
ancomycin daily, as well as Levaquin.  Notably, her infection has markedly improved, though there is 
still a small area of erythema surrounding the surgical incision.  Her vancomycin will be delivered h
ere at the outpatient infusion center. 



She will plan to return to [place] where she permanently resides in about 5 days.  She will follow up
 likely with Dr. Yoder for wound care in the interim period.  She was given warnings and side effects
 of antibiotics.  She is, otherwise, discharged in stable condition.



BILLING:  I spent more than 30 minutes on the day of discharge coordinating care.





Job #:  [id number]

## 2018-12-23 NOTE — SOAPPROG
SOAP Progress Note


Assessment/Plan: 


Assessment:





FOLLOW-UP NECROTIC NIPPLE EXCISION WITH REMOVAL OF IMPLANT


WOUND IS CLEAN BUT MILDLY ERYTHEMATOUS WHICH MAY BE CHRONIC FROM HER RADIATION 

THERAPY





DRESSING CHANGE TODAY WITH THE WOUND 0 PACKED WITH IODOFORM GAUZE




















Plan:  HOME WHEN OKAYED BY INFECTIOUS DISEASE





12/22/18 14:53





12/23/18 10:03


DOING WELL THIS A.M. WITH DECREASED PAIN/WOUND UNCHANGED/AFEBRILE/VITAL SIGNS 

STABLE


HOME WITH DRESSING CARE WHEN DECISION ON HOME ANTIBIOTICS IS MADE BY ID


Objective: 





 Vital Signs











Temp Pulse Resp BP Pulse Ox


 


 36.7 C   91   16   125/74 H  95 


 


 12/23/18 08:00  12/23/18 08:00  12/23/18 08:00  12/23/18 08:00  12/23/18 08:00








 Microbiology











 12/20/18 18:56 Gram Stain - Final





 Breast - Swab 


 


 12/20/18 20:48 Mycobacterial Smear (YOUNG) - Final





 Breast - Eswab Mycobacterial Culture - Final


 


 12/20/18 20:48 Gram Stain - Final





 Breast - Eswab 








 Laboratory Results





 12/22/18 05:25 





 12/23/18 04:00 





 











 12/22/18 12/23/18 12/24/18





 05:59 05:59 05:59


 


Intake Total 900 1250 


 


Output Total 1003  


 


Balance -103 1250 














ICD10 Worksheet


Patient Problems: 


 Problems











Problem Status Onset


 


Cellulitis Acute

## 2018-12-23 NOTE — PCMIDPN
Assessment/Plan: 


Assessment/Plan:


* Left breast infection post reconstruction status post incision and drainage/

resection of necrotic nipple/removal of implant:  Culture showing growth of 

Enterococcus faecalis and Klebsiella oxytoca.  Enterococcal isolate is 

resistant to tetracyclines derivatives.  Still with small area of cellulitis 

around staple line.  Will complete additional 4 days of vancomycin on [address] [place] [address] daily combined with oral levofloxacin once daily for a 7 day 

total course of therapy post debridement and implant removal.  Will re-evaluate 

on [address] [place] [address] on 12/27/2018 to ensure continued clinical 

improvement.  Side effects of fluoroquinolone use including tendinopathy, 

allergic reactions, skin rash, C difficile colitis, potential drug interactions

, and need to avoid concomitant intake of polyvalent cations discussed with 

patient.





12/23/18 13:57





Subjective: 





Overall patient feels better.  Mild pain around staple line.


Objective: 


 Vital Signs











Temp Pulse Resp BP Pulse Ox


 


 36.7 C   91   16   125/74 H  93 


 


 12/23/18 11:14  12/23/18 11:14  12/23/18 11:14  12/23/18 11:14  12/23/18 11:14








 Microbiology











 12/20/18 20:48 Gram Stain - Final





 Breast - Eswab 


 


 12/20/18 18:56 Gram Stain - Final





 Breast - Swab Wound Culture - Final





    Enterococcus Faecalis





    Klebsiella Oxytoca


 


 12/20/18 20:48 Mycobacterial Smear (YOUNG) - Final





 Breast - Eswab Mycobacterial Culture - Final








 Laboratory Results





 12/22/18 05:25 





 12/23/18 04:00 





 











 12/22/18 12/23/18 12/24/18





 05:59 05:59 05:59


 


Intake Total 900 1250 


 


Output Total 1003  


 


Balance -103 1250 








Vancomycin # 3


Cefepime # 3


Breast cultures E faecalis and Klebsiella oxytoca


Blood cultures x2 no growth





- Physical Exam


General Appearance: alert, no apparent distress


EENT: No scleral icterus


Cardiac/Chest: other (Left breast with mild erythema around staple line which 

is blanching in nature; more chronic hyperpigmentation superior to that; 

packing in place centrally; mild tenderness around staple line)





- Time Spent With Patient


Time Spent with Patient: greater than 35 minutes


Time Spent with Patient: Greater than 35 minutes spent on this patients care, 

greater than 50% of time spent counseling, educating, and coordinating care 

regarding the above mentioned plan.





ICD10 Worksheet


Patient Problems: 


 Problems











Problem Status Onset


 


Cellulitis Acute

## 2018-12-23 NOTE — ASMTDCNOTE
Case Management Discharge

 

Discharge Order Complete?     Answers:  No                                    

Patient to Obtain             Answers:  via Family                            

Medications                                                                   

Transportation Arranged       Answers:  Family/Friends                        

Discharge Comments            

Notes:

CM met with patient prior to discharge, she is scheduled for daily Vancomycin infusions at 

11:30.  She will be staying with her mom while in town and will follow up with her providers in 

[place].  Her  will be transporting her at discharge. CAGE questionnaire completed.  CM 

available to support if any additional discharge needs arise. 

 

Date Signed:  12/23/2018 02:19 PM

Electronically Signed By:Angela Mock

## 2018-12-23 NOTE — ASMTCAGE
CAGE

 

Do you feel you ought to      Answers:  Yes                                   

cut down on your drinking                                                     

or drug use?                                                                  

Do people annoy you by        Answers:  No                                    

criticizing your drinking                                                     

or drug use?                                                                  

Do you feel guilty about      Answers:  No                                    

your drinking or drug                                                         

use?                                                                          

Do you drink or use drugs     Answers:  No                                    

first thing in the                                                            

morning ([employer])?                                                         

Additional Comments           patient states she drinks, no drug use. 

 

Date Signed:  12/23/2018 03:51 PM

Electronically Signed By:Angela Mock